# Patient Record
Sex: MALE | Race: WHITE | Employment: OTHER
[De-identification: names, ages, dates, MRNs, and addresses within clinical notes are randomized per-mention and may not be internally consistent; named-entity substitution may affect disease eponyms.]

---

## 2023-03-10 ENCOUNTER — HOSPITAL ENCOUNTER (EMERGENCY)
Facility: HOSPITAL | Age: 23
Discharge: HOME | End: 2023-03-10
Attending: EMERGENCY MEDICINE
Payer: OTHER GOVERNMENT

## 2023-03-10 VITALS
BODY MASS INDEX: 23.8 KG/M2 | SYSTOLIC BLOOD PRESSURE: 136 MMHG | HEART RATE: 61 BPM | WEIGHT: 170 LBS | RESPIRATION RATE: 18 BRPM | OXYGEN SATURATION: 100 % | HEIGHT: 71 IN | DIASTOLIC BLOOD PRESSURE: 75 MMHG | TEMPERATURE: 98.1 F

## 2023-03-10 DIAGNOSIS — R51.9 NONINTRACTABLE EPISODIC HEADACHE, UNSPECIFIED HEADACHE TYPE: ICD-10-CM

## 2023-03-10 DIAGNOSIS — K08.89 TOOTHACHE: Primary | ICD-10-CM

## 2023-03-10 PROCEDURE — 6370000000 HC RX 637 (ALT 250 FOR IP): Performed by: EMERGENCY MEDICINE

## 2023-03-10 PROCEDURE — 96372 THER/PROPH/DIAG INJ SC/IM: CPT

## 2023-03-10 PROCEDURE — 6360000002 HC RX W HCPCS: Performed by: EMERGENCY MEDICINE

## 2023-03-10 PROCEDURE — 99284 EMERGENCY DEPT VISIT MOD MDM: CPT

## 2023-03-10 RX ORDER — AMOXICILLIN 500 MG/1
500 CAPSULE ORAL 2 TIMES DAILY
Qty: 14 CAPSULE | Refills: 0 | Status: SHIPPED | OUTPATIENT
Start: 2023-03-10 | End: 2023-03-13 | Stop reason: ALTCHOICE

## 2023-03-10 RX ORDER — AMOXICILLIN 250 MG/1
500 CAPSULE ORAL
Status: COMPLETED | OUTPATIENT
Start: 2023-03-10 | End: 2023-03-10

## 2023-03-10 RX ORDER — KETOROLAC TROMETHAMINE 30 MG/ML
60 INJECTION, SOLUTION INTRAMUSCULAR; INTRAVENOUS
Status: COMPLETED | OUTPATIENT
Start: 2023-03-10 | End: 2023-03-10

## 2023-03-10 RX ORDER — KETOROLAC TROMETHAMINE 10 MG/1
10 TABLET, FILM COATED ORAL EVERY 6 HOURS PRN
Qty: 20 TABLET | Refills: 0 | Status: SHIPPED | OUTPATIENT
Start: 2023-03-10

## 2023-03-10 RX ADMIN — AMOXICILLIN 500 MG: 250 CAPSULE ORAL at 21:56

## 2023-03-10 RX ADMIN — KETOROLAC TROMETHAMINE 60 MG: 30 INJECTION, SOLUTION INTRAMUSCULAR at 21:56

## 2023-03-10 ASSESSMENT — PAIN DESCRIPTION - LOCATION: LOCATION: TEETH

## 2023-03-10 ASSESSMENT — PAIN - FUNCTIONAL ASSESSMENT: PAIN_FUNCTIONAL_ASSESSMENT: 0-10

## 2023-03-10 ASSESSMENT — PAIN SCALES - GENERAL
PAINLEVEL_OUTOF10: 6
PAINLEVEL_OUTOF10: 9

## 2023-03-11 NOTE — ED TRIAGE NOTES
[facility] EMERGENCY DEPT  EMERGENCY DEPARTMENT ENCOUNTER       Pt Name: Katia Rollins  MRN: [id number]  [place] 2000  Date of evaluation: 3/10/2023  Provider: Louise Jenkins MD   PCP: None Provider  Note Started: 11:24 PM 3/10/23     CHIEF COMPLAINT       Chief Complaint   Patient presents with    Migraine        HISTORY OF PRESENT ILLNESS: 1 or more elements      History From: Patient  History limited by: Nothing     Katia Rollins is a 20 y.o. male who presents to the ED complaining of tooth ache and headache. Patient reports he has had a hole in his bottom right tooth for about a couple weeks. Began having worsening tooth ache since yesterday. Today he had headache which he thinks is related to his tooth but he has had similar migraine headache in the past.  He has not taken anything for the pain. He rates the tooth ache and headache 9 out of 10. The headache is localized to the right side, head bleed. Brief episode double vision. He denies photophobia or phonophobia. Double vision with my exam.  Nursing Notes were all reviewed and agreed with or any disagreements were addressed in the HPI. REVIEW OF SYSTEMS      Review of Systems     Positives and Pertinent negatives as per HPI. PAST HISTORY     Past Medical History:  No past medical history on file. Past Surgical History:  No past surgical history on file. Family History:  No family history on file. Social History: Allergies:  No Known Allergies    CURRENT MEDICATIONS      Discharge Medication List as of 3/10/2023 11:09 PM          SCREENINGS               No data recorded         PHYSICAL EXAM      Vitals:    03/10/23 2102   BP: 136/75   Pulse: 61   Resp: 18   Temp: 98.1 °F (36.7 °C)   TempSrc: Oral   SpO2: 100%   Weight: 170 lb (77.1 kg)   Height: 5' 11\" (1.803 m)     Physical Exam    Nursing notes and vital signs reviewed    Constitutional: Patient appears in no acute distress.   Head: Normocephalic, Atraumatic  Eyes: EOMI  Dental caries #31, no obvious swelling. Mild tenderness to palpation of the gums around the tooth. Neck: Supple  Cardiovascular: Regular rate and rhythm, no murmurs, rubs, or gallops  Chest: Normal work of breathing and chest excursion bilaterally  Lungs: Clear to ausculation bilaterally  Abdomen: Soft, non tender, non distended, normoactive bowel sounds  Back: No evidence of trauma or deformity  Extremities: No evidence of trauma or deformity, no LE edema  Skin: Warm and dry, normal cap refill  Neuro: Alert and appropriate, CN intact, normal speech, strength and sensation full and symmetric bilaterally, normal gait,Psychiatric: Normal mood and affect     DIAGNOSTIC RESULTS   LABS:     Labs Reviewed - No data to display     RADIOLOGY:  Non-plain film images such as CT, Ultrasound and MRI are read by the radiologist. Plain radiographic images are visualized and preliminarily interpreted by the ED Provider with the below findings:          Interpretation per the Radiologist below, if available at the time of this note:     No orders to display        PROCEDURES   Unless otherwise noted below, none  Procedures     CRITICAL CARE [address] [place] [address] Rd and DIFFERENTIAL DIAGNOSIS/MDM   Vitals:    Vitals:    03/10/23 2102   BP: 136/75   Pulse: 61   Resp: 18   Temp: 98.1 °F (36.7 °C)   TempSrc: Oral   SpO2: 100%   Weight: 170 lb (77.1 kg)   Height: 5' 11\" (1.803 m)        Patient was given the following medications:  Medications   amoxicillin (AMOXIL) capsule 500 mg (500 mg Oral Given 3/10/23 2156)   ketorolac (TORADOL) injection 60 mg (60 mg IntraMUSCular Given 3/10/23 2156)       CONSULTS: (Who and What was discussed)  None    Chronic Conditions: As above    Social Determinants affecting Dx or Tx:  None    Records Reviewed (source and summary): Old medical records. Nursing notes.       CC/HPI Summary, DDx, ED Course, and Reassessment:     Patient reports for in the right bottom tooth for about 2 weeks. Began having worsening tooth ache since yesterday and today associated headache which is which is similar to previous migraine. Headache associated with brief episodes of double vision. Denies double vision with my exam.  Patient appears fairly comfortable with my exam.  Exam was unremarkable for caries #31. Tooth ache and headache resolved with Toradol and amoxicillin. Oumar exam totally unremarkable with no focal neuro findings. No further imaging indicated with patient's symptoms resolved, no focal neuro findings. Disposition Considerations (Tests not done, Shared Decision Making, Pt Expectation of Test or Tx.):      FINAL IMPRESSION     1. Toothache    2. Nonintractable episodic headache, unspecified headache type         DISPOSITION/PLAN   DISPOSITION Decision To Discharge 03/10/2023 11:05:40 PM      Discharged     PATIENT REFERRED TO:  [address] [place] [address]  [address]  [address] [facility] [address] [place] [address]  In 2 days      [facility]  [address] [address] [phone]           DISCHARGE MEDICATIONS:  Discharge Medication List as of 3/10/2023 11:09 PM             Details   amoxicillin (AMOXIL) 500 MG capsule Take 1 capsule by mouth 2 times daily for 7 days, Disp-14 capsule, R-0Normal      ketorolac (TORADOL) 10 MG tablet Take 1 tablet by mouth every 6 hours as needed for Pain, Disp-20 tablet, R-0Normal             DISCONTINUED MEDICATIONS:  Discharge Medication List as of 3/10/2023 11:09 PM          I am the Primary Clinician of Record. Chriss Livingston MD (electronically signed)    (Please note that parts of this dictation were completed with voice recognition software. Quite often unanticipated grammatical, syntax, homophones, and other interpretive errors are inadvertently transcribed by the computer software. Please disregards these errors.  Please excuse any errors that have escaped final proofreading.)

## 2023-03-11 NOTE — ED TRIAGE NOTES
Pt c/o 9/10 r frontal head pain and double vision that started today; no otc taken today.  Pt also c/o r lower dental pain from a broken tooth for the past couple of weeks

## 2023-03-13 RX ORDER — PENICILLIN V POTASSIUM 500 MG/1
500 TABLET ORAL 4 TIMES DAILY
Qty: 28 TABLET | Refills: 0 | Status: SHIPPED | OUTPATIENT
Start: 2023-03-13 | End: 2023-03-20